# Patient Record
Sex: FEMALE | Race: WHITE
[De-identification: names, ages, dates, MRNs, and addresses within clinical notes are randomized per-mention and may not be internally consistent; named-entity substitution may affect disease eponyms.]

---

## 2018-12-22 VITALS — SYSTOLIC BLOOD PRESSURE: 112 MMHG | DIASTOLIC BLOOD PRESSURE: 65 MMHG

## 2019-03-21 ENCOUNTER — HOSPITAL ENCOUNTER (OUTPATIENT)
Dept: HOSPITAL 44 - OPSURG | Age: 49
Discharge: HOME | End: 2019-03-21
Attending: SURGERY
Payer: COMMERCIAL

## 2019-03-21 DIAGNOSIS — R11.2: Primary | ICD-10-CM

## 2019-03-21 DIAGNOSIS — Z98.84: ICD-10-CM

## 2019-03-21 PROCEDURE — 11042 DBRDMT SUBQ TIS 1ST 20SQCM/<: CPT

## 2019-03-21 PROCEDURE — 43233 EGD BALLOON DIL ESOPH30 MM/>: CPT

## 2019-03-24 NOTE — OPERATIVE NOTE
PREOPERATIVE DIAGNOSIS:  

1. Nausea and vomiting.   

2. History of gastric sleeve.  



POSTOPERATIVE DIAGNOSIS:  

Normal upper gastrointestinal endoscopy, no evidence of stricture.



PROCEDURES PERFORMED:  

Upper gastrointestinal endoscopy and balloon dilation of GE junction. 



SURGEON:

Fred Hernandez M.D.



INDICATIONS FOR PROCEDURE:   Ms. Angelita Montoya is a 49-year-old female who 
underwent a laparoscopic sleeve gastrectomy that was uneventful about three 
months ago.  The patient states that she has been frequently vomiting and has 
not been able to keep much down.  An upper GI study was done and showed possibly
some narrowed area near the GE junction.  Therefore, the patient was advised an 
upper GI endoscopy and possible balloon dilation.  



DESCRIPTION OF PROCEDURE:  After explaining to the patient in detail and 
informed consent was obtained, the patient was identified in the preoperative 
holding area.  The patient was transferred to the operating room and was placed 
in supine position.  Sequential compressive devices were placed for DVT 
prophylaxis.  After induction of anesthesia, the scope was introduced into the 
esophagus and was gradually advanced into the stomach.  There was no evidence of
a hiatal hernia.  The GE junction appeared normal.  There was no evidence of 
stricture.  The sleeve appeared to be of adequate size.  There was no narrowing 
or stenosis that was noted anywhere in the sleeve.  However, I went ahead and 
dilated the GE junction with an 18 to 20 CRE Balloon for about three minutes.  
The stomach was then deflated post dilation.  Post-dilation EGD was 
satisfactory.  The scope was removed.  The patient was stable at the end of the 
procedure.  The patient was awakened from anesthesia and was transferred to the 
recovery room in stable condition.  



ESTIMATED BLOOD LOSS:  

None.



CONDITION OF THE PATIENT:  

Stable.   



FLUIDS GIVEN:  

Per Anesthesia note.  



SPECIMEN(S) SENT:

None.



COMPLICATIONS:

None.   



ANESTHESIA:

General.

MTDD